# Patient Record
Sex: MALE | Race: WHITE | NOT HISPANIC OR LATINO
[De-identification: names, ages, dates, MRNs, and addresses within clinical notes are randomized per-mention and may not be internally consistent; named-entity substitution may affect disease eponyms.]

---

## 2022-09-07 PROBLEM — Z00.00 ENCOUNTER FOR PREVENTIVE HEALTH EXAMINATION: Status: ACTIVE | Noted: 2022-09-07

## 2022-09-08 ENCOUNTER — APPOINTMENT (OUTPATIENT)
Dept: ORTHOPEDIC SURGERY | Facility: CLINIC | Age: 62
End: 2022-09-08

## 2022-09-08 VITALS — HEIGHT: 72 IN | WEIGHT: 180 LBS | BODY MASS INDEX: 24.38 KG/M2

## 2022-09-08 DIAGNOSIS — Z86.69 PERSONAL HISTORY OF OTHER DISEASES OF THE NERVOUS SYSTEM AND SENSE ORGANS: ICD-10-CM

## 2022-09-08 DIAGNOSIS — Z78.9 OTHER SPECIFIED HEALTH STATUS: ICD-10-CM

## 2022-09-08 DIAGNOSIS — Z86.718 PERSONAL HISTORY OF OTHER VENOUS THROMBOSIS AND EMBOLISM: ICD-10-CM

## 2022-09-08 DIAGNOSIS — I87.2 VENOUS INSUFFICIENCY (CHRONIC) (PERIPHERAL): ICD-10-CM

## 2022-09-08 PROCEDURE — 73562 X-RAY EXAM OF KNEE 3: CPT | Mod: 50

## 2022-09-08 PROCEDURE — 99203 OFFICE O/P NEW LOW 30 MIN: CPT

## 2022-09-08 RX ORDER — ROSUVASTATIN CALCIUM 5 MG/1
TABLET, FILM COATED ORAL
Refills: 0 | Status: ACTIVE | COMMUNITY

## 2022-09-08 RX ORDER — RIVAROXABAN 2.5 MG/1
TABLET, FILM COATED ORAL
Refills: 0 | Status: ACTIVE | COMMUNITY

## 2022-09-08 NOTE — IMAGING
[de-identified] : Normal gait\par \par Right knee\par No swelling\par No facet or joint line tenderness\par Passive range of motion 0° to 130°\par Ligaments are stable\par Quad strength 5/5\par \par Left knee\par No swelling\par No facet or joint line tenderness\par Passive range of motion 0° to 130°\par Ligaments are stable\par Quad strength 5/5\par \par Both legs\par No swelling\par Calves are soft and nontender\par Posterior tibial pulse 2+ [Bilateral] : knee bilaterally [FreeTextEntry9] : Reviewed and interpreted.  Right knee AP standing, lateral, sunrise views-mild degenerative changes\par \par Review and interpreted.  Left knee AP standing, lateral, sunrise views-mild degenerative changes.  Smooth ossicle in the region of the tibial tubercle

## 2022-09-08 NOTE — DISCUSSION/SUMMARY
[de-identified] : The patient was instructed in isometric strengthening\par Ice p.r.n.\par Unable to take NSAIDs because he is on Xarelto\par He can try-type 2 undenatured collagen\par I discussed possible Gelsyn injections if he has any increased persistent pain.  I gave him a pamphlet\par \par Impression:\par Mild osteoarthritis right knee-currently asymptomatic\par Mild osteoarthritis left knee-currently asymptomatic

## 2022-09-08 NOTE — HISTORY OF PRESENT ILLNESS
[Gradual] : gradual [5] : 5 [0] : 0 [Dull/Aching] : dull/aching [Intermittent] : intermittent [Leisure] : leisure [Rest] : rest [Exercising] : exercising [Retired] : Work status: retired [de-identified] : Patient is a 62-year-old male with history of intermittent pain in his knees after hiking.  He is going to be going to [place] hiking on October 22.  He denies any pain in his knees at the present time.  He was referred by his brother-in-law Ajay Major for evaluation [] : Post Surgical Visit: no [de-identified] : 3/10/2018 [de-identified] : Dr. Hughes

## 2023-05-31 ENCOUNTER — APPOINTMENT (OUTPATIENT)
Dept: ORTHOPEDIC SURGERY | Facility: CLINIC | Age: 63
End: 2023-05-31
Payer: COMMERCIAL

## 2023-05-31 VITALS — WEIGHT: 180 LBS | BODY MASS INDEX: 24.38 KG/M2 | HEIGHT: 72 IN

## 2023-05-31 DIAGNOSIS — Z87.2 PERSONAL HISTORY OF DISEASES OF THE SKIN AND SUBCUTANEOUS TISSUE: ICD-10-CM

## 2023-05-31 PROCEDURE — 20611 DRAIN/INJ JOINT/BURSA W/US: CPT

## 2023-05-31 PROCEDURE — 99214 OFFICE O/P EST MOD 30 MIN: CPT | Mod: 25

## 2023-05-31 RX ORDER — METHYLPREDNISOLONE ACETATE 40 MG/ML
40 INJECTION, SUSPENSION INTRA-ARTICULAR; INTRALESIONAL; INTRAMUSCULAR; SOFT TISSUE
Refills: 0 | Status: COMPLETED | OUTPATIENT
Start: 2023-05-31

## 2023-05-31 RX ADMIN — METHYLPREDNISOLONE ACETATE MG/ML: 40 INJECTION, SUSPENSION INTRA-ARTICULAR; INTRALESIONAL; INTRAMUSCULAR; SOFT TISSUE at 00:00

## 2023-05-31 NOTE — HISTORY OF PRESENT ILLNESS
[Gradual] : gradual [7] : 7 [Dull/Aching] : dull/aching [Intermittent] : intermittent [Leisure] : leisure [Rest] : rest [Exercising] : exercising [Retired] : Work status: retired [de-identified] : The patient has had increased pain in both knees over the past several weeks.  He has mild to moderate pain when hiking.  The left knee bothers him more than the right.  Pain with stairs and movie sign.  Taking Tylenol as needed.  Unable to take NSAIDs because he is on Xarelto [] : Post Surgical Visit: no [de-identified] : 9/8/2022 [de-identified] : Dr. Bates

## 2023-05-31 NOTE — IMAGING
[de-identified] : Normal gait\par \par Right knee\par No swelling\par Mild medial facet and joint line tenderness\par Passive range of motion 0° to 130°\par Ligaments are stable\par Quad strength 5/5\par \par Left knee\par No swelling\par Mild to moderate medial facet and joint line tenderness\par Passive range of motion 0° to 130°\par Ligaments are stable\par Quad strength 5/5\par \par Both legs\par No swelling.  Mild psoriatic skin changes bilaterally\par Calves are soft and nontender\par Posterior tibial pulse 2+

## 2023-05-31 NOTE — REASON FOR VISIT
[FreeTextEntry2] : Increased pain in both knees over the past several weeks, left greater than right

## 2023-05-31 NOTE — PROCEDURE
[Large Joint Injection] : Large joint injection [Left] : of the left [Knee] : knee [Pain] : pain [Alcohol] : alcohol [Betadine] : betadine [Ethyl Chloride sprayed topically] : ethyl chloride sprayed topically [Sterile technique used] : sterile technique used [___ cc    1%] : Lidocaine ~Vcc of 1%  [___ cc    40mg] : Methylprednisolone (Depomedrol) ~Vcc of 40 mg  [] : Patient tolerated procedure well [Patient was advised to rest the joint(s) for ____ days] : patient was advised to rest the joint(s) for [unfilled] days [Risks, benefits, alternatives discussed / Verbal consent obtained] : the risks benefits, and alternatives have been discussed, and verbal consent was obtained [de-identified] : To ensure intra-articular injection [FreeTextEntry3] : Do not submerge underwater for 24 hours

## 2023-06-12 ENCOUNTER — APPOINTMENT (OUTPATIENT)
Dept: ORTHOPEDIC SURGERY | Facility: CLINIC | Age: 63
End: 2023-06-12
Payer: COMMERCIAL

## 2023-06-12 VITALS — HEIGHT: 72 IN | BODY MASS INDEX: 24.38 KG/M2 | WEIGHT: 180 LBS

## 2023-06-12 DIAGNOSIS — M17.0 BILATERAL PRIMARY OSTEOARTHRITIS OF KNEE: ICD-10-CM

## 2023-06-12 DIAGNOSIS — M22.42 CHONDROMALACIA PATELLAE, LEFT KNEE: ICD-10-CM

## 2023-06-12 DIAGNOSIS — M22.41 CHONDROMALACIA PATELLAE, RIGHT KNEE: ICD-10-CM

## 2023-06-12 PROCEDURE — 99213 OFFICE O/P EST LOW 20 MIN: CPT

## 2023-06-12 RX ORDER — HALOBETASOL PROPIONATE 0.5 MG/G
0.05 OINTMENT TOPICAL
Qty: 50 | Refills: 0 | Status: ACTIVE | COMMUNITY
Start: 2023-01-23

## 2023-06-12 RX ORDER — CLOBETASOL PROPIONATE 0.5 MG/ML
0.05 SOLUTION TOPICAL
Qty: 50 | Refills: 0 | Status: ACTIVE | COMMUNITY
Start: 2023-06-06

## 2023-06-12 RX ORDER — TADALAFIL 20 MG/1
20 TABLET ORAL
Qty: 6 | Refills: 0 | Status: ACTIVE | COMMUNITY
Start: 2022-12-13

## 2023-06-12 NOTE — IMAGING
[de-identified] : Normal gait\par \par Right knee\par No swelling.  10 x 6 cm area of ecchymosis medial aspect of the distal thigh.  No swelling.  No erythema.  Slight tenderness in this region.  No palpable fluctuance\par Mild medial facet and joint line tenderness\par Passive range of motion 0° to 130°\par Ligaments are stable\par Quad strength 5/5\par \par Left knee\par No swelling\par Mild medial facet and joint line tenderness\par Passive range of motion 0° to 130°\par Ligaments are stable\par Quad strength 5/5\par \par Both legs\par No swelling.  Mild psoriatic skin changes bilaterally\par Calves are soft and nontender\par Posterior tibial pulse 2+

## 2023-06-12 NOTE — DATA REVIEWED
[Venous Doppler] : A Venous Doppler test was completed of the [Left] : left [Lower extremity] : lower extremity [Negative] : negative [FreeTextEntry1] : Venous Doppler left lower extremity done at Dr. Polanco May 16, 2023 was reported as negative for DVT.  No popliteal cyst

## 2023-06-12 NOTE — DISCUSSION/SUMMARY
[de-identified] : The patient is going to be going hiking in the [place] on June 24\par Again I discussed Gelsyn injections for both knees.  Risk benefits and the alternatives were discussed.  He would like to do this\par Ice p.r.n.\par Unable to take NSAIDs because he is on Xarelto\par Continued type II undenatured collagen\par He will wash area of ecchymosis with moisturizing soap when showering.  Apply moisturizing cream twice daily.  Monitor for any signs of infection, redness, fever, chills and go to urgent care or ER if any problem\par \par Impression:\par Mild osteoarthritis right knee/chondromalacia patella\par Mild osteoarthritis left knee/chondromalacia patella

## 2023-06-12 NOTE — HISTORY OF PRESENT ILLNESS
[Gradual] : gradual [Dull/Aching] : dull/aching [Intermittent] : intermittent [Retired] : Work status: retired [3] : 3 [de-identified] : The patient says his left knee feels a little better since cortisone injection last visit.  He has continued mild pain in both knees standing and walking.  The left knee bothers him more than the right.  Pain with stairs and movie sign.  He has occasional pain in his left posterior knee region.  He had been sent for venous Doppler 1 month ago.  He has history of DVT.  He is on Xarelto.\par The patient says he bruised the inner aspect of his right distal thigh on Saturday, [place] [address] when he was stepping over a seat [] : Post Surgical Visit: no [de-identified] : 5/31/23 [de-identified] : Dr. Bates

## 2023-07-12 ENCOUNTER — APPOINTMENT (OUTPATIENT)
Dept: ORTHOPEDIC SURGERY | Facility: CLINIC | Age: 63
End: 2023-07-12
Payer: COMMERCIAL

## 2023-07-12 VITALS — BODY MASS INDEX: 24.38 KG/M2 | HEIGHT: 72 IN | WEIGHT: 180 LBS

## 2023-07-12 PROCEDURE — 20611 DRAIN/INJ JOINT/BURSA W/US: CPT | Mod: LT

## 2023-07-12 NOTE — IMAGING
[de-identified] : Normal gait\par \par \par Left knee\par No swelling\par Mild medial facet and joint line tenderness\par Passive range of motion 0° to 130°\par \par Left leg\par No swelling.  Mild psoriatic skin changes\par Calf is soft and nontender\par

## 2023-07-12 NOTE — PROCEDURE
[Large Joint Injection] : Large joint injection [Left] : of the left [Knee] : knee [Pain] : pain [Alcohol] : alcohol [Betadine] : betadine [Ethyl Chloride sprayed topically] : ethyl chloride sprayed topically [Sterile technique used] : sterile technique used [Gel-Syn (16.8mg)] : 16.8mg of Gel-Syn [#1] : series #1 [] : Patient tolerated procedure well [Patient was advised to rest the joint(s) for ____ days] : patient was advised to rest the joint(s) for [unfilled] days [Risks, benefits, alternatives discussed / Verbal consent obtained] : the risks benefits, and alternatives have been discussed, and verbal consent was obtained [All ultrasound images have been permanently captured and stored accordingly in our picture archiving and communication system] : All ultrasound images have been permanently captured and stored accordingly in our picture archiving and communication system [de-identified] : To ensure intra-articular injection [FreeTextEntry3] : Do not submerge underwater for 24 hours

## 2023-07-12 NOTE — HISTORY OF PRESENT ILLNESS
[Gradual] : gradual [3] : 3 [Dull/Aching] : dull/aching [Intermittent] : intermittent [Leisure] : leisure [Rest] : rest [Stairs] : stairs [Retired] : Work status: retired [1] : 1 [Gelsyn] : Gelsyn [de-identified] : The patient says that his right knee pain has resolved at the present time.  He has continued mild pain left knee standing and walking [] : Post Surgical Visit: no [de-identified] : 6/12/2023 [de-identified] : Dr. Bates

## 2023-07-12 NOTE — DISCUSSION/SUMMARY
[de-identified] : The patient would like to proceed with Gelsyn injection left knee.  Risk benefits and the alternatives were discussed\par Ice p.r.n.\par Unable to take NSAIDs because he is on Xarelto\par Continued type II undenatured collagen\par \par Impression:\par Mild osteoarthritis right knee/chondromalacia patella-asymptomatic\par Mild osteoarthritis left knee/chondromalacia patella

## 2023-07-13 NOTE — DISCUSSION/SUMMARY
[de-identified] : Various options were discussed with the patient\par The patient is going to be going [place] [employer] [place] on [place]\par I offered him a cortisone injection left knee today.  Risks benefits and the alternatives were discussed.  He wanted to try this\par I discussed trying Gelsyn injections when he returns from his trip.  I gave him a pamphlet.  Risk benefits and the alternatives were discussed.  He would like to do this\par Ice p.r.n.\par Unable to take NSAIDs because he is on Xarelto\par Continued type II undenatured collagen\par \par Impression:\par Mild osteoarthritis right knee/chondromalacia patella\par Mild osteoarthritis left knee/chondromalacia patella Nsaids Pregnancy And Lactation Text: These medications are considered safe up to 30 weeks gestation. It is excreted in breast milk.

## 2023-07-19 ENCOUNTER — APPOINTMENT (OUTPATIENT)
Dept: ORTHOPEDIC SURGERY | Facility: CLINIC | Age: 63
End: 2023-07-19
Payer: COMMERCIAL

## 2023-07-19 VITALS — HEIGHT: 72 IN | BODY MASS INDEX: 24.38 KG/M2 | WEIGHT: 180 LBS

## 2023-07-19 PROCEDURE — 20611 DRAIN/INJ JOINT/BURSA W/US: CPT | Mod: LT

## 2023-07-19 NOTE — HISTORY OF PRESENT ILLNESS
[Gradual] : gradual [3] : 3 [Dull/Aching] : dull/aching [Leisure] : leisure [Rest] : rest [Stairs] : stairs [Retired] : Work status: retired [2] : 2 [Gelsyn] : Gelsyn [de-identified] : The patient feels better after the first Gelsyn injection left knee.  He has mild occasional pain standing and walking [] : Post Surgical Visit: no [de-identified] : 7/12/2023

## 2023-07-19 NOTE — PROCEDURE
[Large Joint Injection] : Large joint injection [Left] : of the left [Knee] : knee [Pain] : pain [Alcohol] : alcohol [Betadine] : betadine [Ethyl Chloride sprayed topically] : ethyl chloride sprayed topically [Sterile technique used] : sterile technique used [Gel-Syn (16.8mg)] : 16.8mg of Gel-Syn [#2] : series #2 [] : Patient tolerated procedure well [Patient was advised to rest the joint(s) for ____ days] : patient was advised to rest the joint(s) for [unfilled] days [Risks, benefits, alternatives discussed / Verbal consent obtained] : the risks benefits, and alternatives have been discussed, and verbal consent was obtained [All ultrasound images have been permanently captured and stored accordingly in our picture archiving and communication system] : All ultrasound images have been permanently captured and stored accordingly in our picture archiving and communication system [de-identified] : To ensure intra-articular injection [FreeTextEntry3] : Do not submerge underwater for 24 hours

## 2023-07-19 NOTE — IMAGING
[de-identified] : Normal gait\par \par Left knee\par No swelling\par Mild medial facet and joint line tenderness\par Passive range of motion 0° to 130°\par \par Left leg\par No swelling.  Mild psoriatic skin changes\par Calf is soft and nontender\par

## 2023-07-19 NOTE — DISCUSSION/SUMMARY
[de-identified] : \par Ice p.r.n.\par Unable to take NSAIDs because he is on Xarelto\par Continued type II undenatured collagen\par \par Impression:\par Mild osteoarthritis right knee/chondromalacia patella-asymptomatic\par Mild osteoarthritis left knee/chondromalacia patella

## 2023-07-26 ENCOUNTER — APPOINTMENT (OUTPATIENT)
Dept: ORTHOPEDIC SURGERY | Facility: CLINIC | Age: 63
End: 2023-07-26
Payer: COMMERCIAL

## 2023-07-26 VITALS — BODY MASS INDEX: 24.38 KG/M2 | HEIGHT: 72 IN | WEIGHT: 180 LBS

## 2023-07-26 DIAGNOSIS — M17.12 UNILATERAL PRIMARY OSTEOARTHRITIS, LEFT KNEE: ICD-10-CM

## 2023-07-26 PROCEDURE — 20611 DRAIN/INJ JOINT/BURSA W/US: CPT | Mod: LT

## 2023-07-26 RX ORDER — HYALURONATE SODIUM 16.8MG/2ML
16.8 SYRINGE (ML) INTRAARTICULAR
Refills: 0 | Status: COMPLETED | OUTPATIENT
Start: 2023-07-26

## 2023-07-26 NOTE — PHYSICAL EXAM
[Left] : left knee [NL (0)] : extension 0 degrees [] : non-antalgic [TWNoteComboBox7] : flexion 130 degrees

## 2023-07-26 NOTE — HISTORY OF PRESENT ILLNESS
[Gradual] : gradual [Dull/Aching] : dull/aching [Leisure] : leisure [Rest] : rest [Stairs] : stairs [Retired] : Work status: retired [3] : 3 [Gelsyn] : Gelsyn [] : Post Surgical Visit: no [de-identified] : 7/19/2023

## 2023-07-26 NOTE — PROCEDURE
[Large Joint Injection] : Large joint injection [Left] : of the left [Knee] : knee [Pain] : pain [X-ray evidence of Osteoarthritis on this or prior visit] : x-ray evidence of Osteoarthritis on this or prior visit [Betadine] : betadine [Ethyl Chloride sprayed topically] : ethyl chloride sprayed topically [Sterile technique used] : sterile technique used [Gel-Syn (16.8mg)] : 16.8mg of Gel-Syn [#3] : series #3 [] : Patient tolerated procedure well [Apply ice for 15min out of every hour for the next 12-24 hours as tolerated] : apply ice for 15 minutes out of every hour for the next 12-24 hours as tolerated [Patient was advised to rest the joint(s) for ____ days] : patient was advised to rest the joint(s) for [unfilled] days [Risks, benefits, alternatives discussed / Verbal consent obtained] : the risks benefits, and alternatives have been discussed, and verbal consent was obtained [Prior failure or difficult injection] : prior failure or difficult injection [All ultrasound images have been permanently captured and stored accordingly in our picture archiving and communication system] : All ultrasound images have been permanently captured and stored accordingly in our picture archiving and communication system [Visualization of the needle and placement of injection was performed without complication] : visualization of the needle and placement of injection was performed without complication

## 2023-10-02 ENCOUNTER — APPOINTMENT (OUTPATIENT)
Dept: ORTHOPEDIC SURGERY | Facility: CLINIC | Age: 63
End: 2023-10-02

## 2024-05-03 ENCOUNTER — APPOINTMENT (OUTPATIENT)
Dept: ORTHOPEDIC SURGERY | Facility: CLINIC | Age: 64
End: 2024-05-03

## 2024-05-13 ENCOUNTER — APPOINTMENT (OUTPATIENT)
Dept: ORTHOPEDIC SURGERY | Facility: CLINIC | Age: 64
End: 2024-05-13
Payer: MEDICARE

## 2024-05-13 VITALS — BODY MASS INDEX: 24.11 KG/M2 | WEIGHT: 178 LBS | HEIGHT: 72 IN

## 2024-05-13 DIAGNOSIS — M19.012 PRIMARY OSTEOARTHRITIS, LEFT SHOULDER: ICD-10-CM

## 2024-05-13 DIAGNOSIS — M25.812 OTHER SPECIFIED JOINT DISORDERS, LEFT SHOULDER: ICD-10-CM

## 2024-05-13 PROCEDURE — 99213 OFFICE O/P EST LOW 20 MIN: CPT

## 2024-05-13 PROCEDURE — 73030 X-RAY EXAM OF SHOULDER: CPT | Mod: LT

## 2024-05-13 NOTE — DISCUSSION/SUMMARY
[de-identified] : Ice prn Tylenol as needed.  Unable to take NSAIDs because he is on Xarelto I discussed possible cortisone injections left shoulder I discussed possible Euflexxa injections He is referred for physical therapy program He will have MRI left shoulder He will avoid irritating activities and exercises  Impression: Left shoulder impingement Mild glenohumeral osteoarthritis/AC joint osteoarthritis

## 2024-05-13 NOTE — HISTORY OF PRESENT ILLNESS
[7] : 7 [0] : 0 [Dull/Aching] : dull/aching [Intermittent] : intermittent [Household chores] : household chores [Sleep] : sleep [Rest] : rest [Lying in bed] : lying in bed [de-identified] : Patient is a 64-year-old right-hand-dominant male with left shoulder pain over the past several weeks.  No injury.  He does workout in the gym on a regular basis.  He has mild pain reaching above him and behind him.  No awakening from sleep at night.  No neck pain.  No chest pain or shortness of breath.  Unable to take NSAIDs because he is on Xarelto.  He has history of surgery left clavicle for thoracic outlet syndrome 15 years ago [] : no [FreeTextEntry1] : Left shoulder

## 2024-05-13 NOTE — IMAGING
[Left] : left shoulder [de-identified] : Cervical spine Inspection-normal Active range of motion-normal Palpation-no tenderness Negative foraminal closing test bilaterally  Shoulder-left Inspection-no swelling Palpation-mild tenderness AC joint and anterior subacromial region Active range of motion-normal Impingement-positive 170 Supraspinatus strength 5 -/5 Radial pulse 2+ [FreeTextEntry1] : Reviewed and interpreted.  Left shoulder external rotation and outlet views-type I acromion.  Mild degenerative changes of the glenohumeral joint.  Vascular clips left shoulder region

## 2024-05-16 ENCOUNTER — RESULT REVIEW (OUTPATIENT)
Age: 64
End: 2024-05-16

## 2024-05-19 ENCOUNTER — NON-APPOINTMENT (OUTPATIENT)
Age: 64
End: 2024-05-19

## 2024-06-03 ENCOUNTER — APPOINTMENT (OUTPATIENT)
Dept: ORTHOPEDIC SURGERY | Facility: CLINIC | Age: 64
End: 2024-06-03
Payer: MEDICARE

## 2024-06-03 VITALS — HEIGHT: 72 IN | BODY MASS INDEX: 24.11 KG/M2 | WEIGHT: 178 LBS

## 2024-06-03 DIAGNOSIS — M75.52 BURSITIS OF LEFT SHOULDER: ICD-10-CM

## 2024-06-03 DIAGNOSIS — M19.012 PRIMARY OSTEOARTHRITIS, LEFT SHOULDER: ICD-10-CM

## 2024-06-03 DIAGNOSIS — M75.112 INCOMPLETE ROTATOR CUFF TEAR OR RUPTURE OF LEFT SHOULDER, NOT SPECIFIED AS TRAUMATIC: ICD-10-CM

## 2024-06-03 DIAGNOSIS — R93.6 ABNORMAL FINDINGS ON DIAGNOSTIC IMAGING OF LIMBS: ICD-10-CM

## 2024-06-03 PROCEDURE — 99214 OFFICE O/P EST MOD 30 MIN: CPT

## 2024-06-03 NOTE — DISCUSSION/SUMMARY
[de-identified] : MRI left shoulder was discussed with the patient Ice prn Tylenol as needed.  Unable to take NSAIDs because he is on Xarelto Continue physical therapy and will start PT over the next several weeks, then home program I discussed possible arthroscopy and rotator cuff repair.  I did explain to him that over time, the tear may become larger and nonrepairable He is not interested in any surgery at the present time He will have repeat MRI left shoulder in 6 months He will let me know before if he has any recurrence of pain He will avoid irritating activities and exercises  Impression: Left shoulder bursitis/small partial versus complete rotator cuff tear Mild glenohumeral osteoarthritis/AC joint osteoarthritis

## 2024-06-03 NOTE — HISTORY OF PRESENT ILLNESS
[7] : 7 [0] : 0 [Dull/Aching] : dull/aching [Intermittent] : intermittent [Household chores] : household chores [Sleep] : sleep [Rest] : rest [Lying in bed] : lying in bed [de-identified] : The patient has had a few sessions of physical therapy.  He is feeling significantly better.  He has mild occasional pain left shoulder with movement.  No awakening from sleep.  No neck pain.  No chest pain or shortness of breath.  He had MRI left shoulder [] : no [FreeTextEntry1] : Left shoulder [de-identified] : 5/19/24 [de-identified] : Dr. Bates

## 2024-06-03 NOTE — IMAGING
[Left] : left shoulder [de-identified] : Cervical spine Inspection-normal Active range of motion-normal Palpation-no tenderness Negative foraminal closing test bilaterally  Shoulder-left Inspection-no swelling Palpation-slight tenderness AC joint  Active range of motion-normal Impingement-negative Supraspinatus strength 5 /5 Radial pulse 2+ [FreeTextEntry1] : Reviewed and interpreted.  Left shoulder external rotation and outlet views-type I acromion.  Mild degenerative changes of the glenohumeral joint.  Vascular clips left shoulder region

## 2024-06-03 NOTE — DATA REVIEWED
[MRI] : MRI [Left] : left [I independently reviewed and interpreted images and report] : I independently reviewed and interpreted images and report [FreeTextEntry1] : MRI left shoulder done May 16, 2024 was reviewed. There are moderate to severe increase signal changes supraspinatus tendon with high-grade partial versus small complete tear. Moderate increased signal changes infraspinatus and subscapularis tendons. Reported as partial-thickness intrasubstance tear anterior infraspinatus measuring approximately 1 cm in length. Degenerative tear superior labrum. Severe degenerative changes of the AC joint

## 2024-06-03 NOTE — REASON FOR VISIT
[FreeTextEntry2] : Decreased pain left shoulder I have personally performed a face to face diagnostic evaluation on this patient. I have reviewed the PA note and agree with the history, exam, and plan of care, except as noted.

## 2024-12-16 ENCOUNTER — APPOINTMENT (OUTPATIENT)
Dept: ORTHOPEDIC SURGERY | Facility: CLINIC | Age: 64
End: 2024-12-16